# Patient Record
(demographics unavailable — no encounter records)

---

## 2022-07-21 LAB
ALBUMIN SERPL-MCNC: 3.8 G/DL (ref 3.5–5.2)
ALBUMIN/GLOB SERPL: 1 {RATIO} (ref 1–2.7)
ALP SERPL-CCNC: 181 UNIT/L (ref 35–117)
ALT SERPL-CCNC: 10 UNIT/L (ref 0–35)
ANION GAP SERPL CALC-SCNC: 13 MMOL/L (ref 2–17)
APPEARANCE UR: CLEAR
AST SERPL-CCNC: 13 UNIT/L (ref 0–35)
BACTERIA #/AREA URNS HPF: ABNORMAL /[HPF]
BASOPHILS # BLD AUTO: 0 X10E3/MCL (ref 0–0.2)
BASOPHILS NFR BLD AUTO: 0.2 % (ref 0–2)
BILIRUB SERPL-MCNC: <0.15 MG/DL (ref 0–1.2)
BILIRUB UR STRIP.AUTO-MCNC: NEGATIVE MG/DL
BUN SERPL-MCNC: 9 MG/DL (ref 6–20)
CALCIUM SERPL-MCNC: 9.7 MG/DL (ref 8.6–10)
CHLORIDE SERPL-SCNC: 104 MMOL/L (ref 98–107)
COLOR UR: ABNORMAL
CREAT SERPL-MCNC: 0.5 MG/DL (ref 0.5–1)
CREATINE UR-MCNC: 152.4 MG/DL (ref 28–217)
DEPRECATED HCO3 PLAS-SCNC: 21 MMOL/L (ref 22–29)
EOSINOPHIL # BLD AUTO: 0.2 X10E3/MCL (ref 0–0.5)
EOSINOPHIL NFR BLD AUTO: 1.4 % (ref 0–7)
EPI CELLS #/AREA URNS HPF: ABNORMAL /LPF
ERYTHROCYTE [DISTWIDTH] IN BLOOD BY AUTOMATED COUNT: 14.4 % (ref 11–16)
GFR SERPL CREATININE-BSD FRML MDRD: 125 ML/MIN/1.73M²
GLOBULIN SER CALC-MCNC: 2.8 G/DL (ref 1.9–4.4)
GLUCOSE SERPL-MCNC: 81 MG/DL (ref 70–99)
GLUCOSE UR STRIP.AUTO-MCNC: NEGATIVE MG/DL
HCT VFR BLD AUTO: 35.4 % (ref 34–47)
HGB BLD-MCNC: 11.5 G/DL (ref 11.5–15.7)
IMMATURE GRANS (ABS): 0.07 X10E3/MCL (ref 0–0.06)
IMMATURE GRANULOCYTES: 0.5 % (ref 0–0.6)
KETONES UR STRIP.AUTO-MCNC: NEGATIVE MG/DL
LEUKOCYTE ESTERASE UR QL STRIP: ABNORMAL
LYMPHOCYTES # SPEC AUTO: 2.5 X10E3/MCL (ref 1–3.2)
LYMPHOCYTES NFR BLD AUTO: 19.6 % (ref 15–45)
MCH RBC QN AUTO: 29 PG (ref 27–34.5)
MCHC RBC AUTO-ENTMCNC: 32.5 G/DL (ref 32–36)
MCV RBC AUTO: 89.4 FL (ref 81–99)
MONOCYTES # BLD AUTO: 0.8 X10E3/MCL (ref 0.3–1)
MONOCYTES NFR BLD AUTO: 6.2 % (ref 4–12)
MUCOUS THREADS #/AREA URNS LPF: ABNORMAL /LPF
NEUTROPHILS # BLD AUTO: 9.2 X10E3/MCL (ref 1.6–7.3)
NEUTROPHILS NFR BLD AUTO: 72.1 % (ref 42–74)
NITRITE UR QL STRIP.AUTO: NEGATIVE
OSMOLALITY SERPL CALC.SUM OF ELEC: 273 MOSM/KG (ref 270–287)
PH UR STRIP.AUTO: 6 [PH] (ref 4.5–8)
PLATELET # BLD AUTO: 258 X10E3/MCL (ref 140–440)
PMV BLD AUTO: 10.4 FL (ref 7.2–13.2)
POTASSIUM SERPL-SCNC: 4.3 MMOL/L (ref 3.5–5.3)
PROT SERPL-MCNC: 6.5 G/DL (ref 6.4–8.3)
PROT UR QL STRIP: ABNORMAL
PROT UR STRIP.AUTO-MCNC: 45.6 MG/DL (ref 0–13.5)
PROT/CREAT RATIO, UR: 0.3 RATIO
RBC # BLD AUTO: 3.96 X10E6/MCL (ref 3.6–5.2)
RBC # UR STRIP: ABNORMAL /UL
RBC #/AREA URNS HPF: ABNORMAL /HPF (ref 0–2)
SODIUM SERPL-SCNC: 138 MMOL/L (ref 135–145)
SP GR UR STRIP: 1.02 (ref 1–1.03)
UROBILINOGEN UR STRIP-MCNC: 0.2 EU/DL
WBC # BLD AUTO: 12.8 X10E3/MCL (ref 3.8–10.6)
WBC #/AREA URNS HPF: ABNORMAL /HPF (ref 0–2)
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
